# Patient Record
Sex: FEMALE | Race: BLACK OR AFRICAN AMERICAN | NOT HISPANIC OR LATINO | URBAN - METROPOLITAN AREA
[De-identification: names, ages, dates, MRNs, and addresses within clinical notes are randomized per-mention and may not be internally consistent; named-entity substitution may affect disease eponyms.]

---

## 2020-03-28 ENCOUNTER — HOSPITAL ENCOUNTER (EMERGENCY)
Facility: HOSPITAL | Age: 55
Discharge: HOME | End: 2020-03-28
Attending: EMERGENCY MEDICINE
Payer: MEDICAID

## 2020-03-28 VITALS
OXYGEN SATURATION: 97 % | SYSTOLIC BLOOD PRESSURE: 113 MMHG | DIASTOLIC BLOOD PRESSURE: 57 MMHG | WEIGHT: 145 LBS | BODY MASS INDEX: 26.68 KG/M2 | RESPIRATION RATE: 18 BRPM | HEIGHT: 62 IN | HEART RATE: 73 BPM | TEMPERATURE: 98 F

## 2020-03-28 DIAGNOSIS — Z20.822 SUSPECTED COVID-19 VIRUS INFECTION: Primary | ICD-10-CM

## 2020-03-28 DIAGNOSIS — B34.9 VIRAL SYNDROME: ICD-10-CM

## 2020-03-28 LAB
CTP QC/QA: YES
POC MOLECULAR INFLUENZA A AGN: NEGATIVE
POC MOLECULAR INFLUENZA B AGN: NEGATIVE

## 2020-03-28 PROCEDURE — 99282 EMERGENCY DEPT VISIT SF MDM: CPT

## 2020-03-28 PROCEDURE — 99282 EMERGENCY DEPT VISIT SF MDM: CPT | Mod: ,,, | Performed by: EMERGENCY MEDICINE

## 2020-03-28 PROCEDURE — 87502 INFLUENZA DNA AMP PROBE: CPT

## 2020-03-28 PROCEDURE — 99282 PR EMERGENCY DEPT VISIT,LEVEL II: ICD-10-PCS | Mod: ,,, | Performed by: EMERGENCY MEDICINE

## 2020-03-28 RX ORDER — AMLODIPINE BESYLATE 5 MG/1
5 TABLET ORAL DAILY
COMMUNITY

## 2020-03-28 NOTE — ED PROVIDER NOTES
Encounter Date: 3/28/2020       History     Chief Complaint   Patient presents with    Shortness of Breath     x 2 days, loss of smell x 2 wks,4 6no diarrhea, denies cough, no fever. Pt has been around ill family members exposed to Covid. [phone]     HPI   54-year-old female with past history of hypertension and anemia coming in with 2 weeks of loss smell as well as 2 weeks of shortness of breath.  No fevers, no cough.  Some mild nausea.  Intermittent headaches, however these have been present for several years.  No abdominal pain no diarrhea.   Patient has been taking over-the-counter cough remedies as needed.    Patient has several family members/friends who themselves have a contact that is known COVID positive and is in the hospital.    Review of patient's allergies indicates:   Allergen Reactions    Penicillins Rash     Past Medical History:   Diagnosis Date    Hypertension      History reviewed. No pertinent surgical history.  History reviewed. No pertinent family history.  Social History     Tobacco Use    Smoking status: Current Every Day Smoker    Smokeless tobacco: Never Used   Substance Use Topics    Alcohol use: Yes    Drug use: Not on file     Review of Systems   Constitutional:  No Fever, No Chills,   Eyes: No Vision Changes  ENT/Mouth:  Positive loss of smell  Cardiovascular:  No Chest Pain  Respiratory:  No Cough, positive SOB  Gastrointestinal:  Positive Nausea, No Vomiting, No Diarrhea, No abdo pain.  Genitourinary:  Infant No dysuria   Musculoskeletal:  No Arthralgias, No Back Pain, No Neck Pain,  Skin:  No skin Lesions  Neuro:  No Weakness, No Dizziness, No Headache        Physical Exam     Initial Vitals   BP Pulse Resp Temp SpO2   03/28/20 0950 03/28/20 0930 03/28/20 0950 03/28/20 0930 03/28/20 0930   (!) 113/57 66 18 99.6 °F (37.6 °C) 97 %      MAP       --                Physical Exam   Physical Exam:  CONSTITUTIONAL: Well developed, well nourished, in no acute distress, calm  HENT:  Normocephalic, atraumatic    EYES: Sclerae anicteric   NECK: Supple  RESPIRATORY: Breathing comfortably. Speaking in full sentences   NEUROLOGIC: Alert, interacting normally. No facial droop.   MSK: Moving all four extremities.  Skin: Warm and dry. No visible rash on exposed areas of skin.    Psych: Mood and affect normal.       ED Course   Procedures  Labs Reviewed   POCT INFLUENZA A/B MOLECULAR          Imaging Results    None          Medical Decision Making:   History:   Old Medical Records: I decided to obtain old medical records.    54-year-old female with past medical history as noted coming in with 2 weeks of loss of smell and shortness of breath.  On exam she is well-appearing, not dyspneic, speaking in full sentences.  Vitals are benign with a normal O2 sat.    Given her good respiratory status and vital signs at this time there is no indication for chest x-ray or labs.    Patient does not meet [facility] criteria for COVID testing at this time.  I explained the patient that given her symptoms, it very likely that she has corona virus, however at this time this seems to be a mild case.    Discharge with home isolation, over-the-counter supportive care such as Tylenol, good hydration, rest, and return precautions for worsening illness or shortness of breath.    Findings of ED work up explained to patient. Patient agrees with discharge plan and verbalizes understanding of return precautions.       MDM Complexity Points:   Problem Points:  1.New problem, with no additional ED work-up planned (maximum of 1) - viral syndrome    Data Points:  Decision to obtain old records (in the EHR)                                                          Ton Russo MD  03/28/20 1017

## 2020-03-28 NOTE — DISCHARGE INSTRUCTIONS
At this point, it is likely that you have COVID-19. Read the following and attached handout for more information.    We are recommending good hand hygiene, isolate yourself from other people, do not go to work, do not attend public events for 14 days or until your symptom free for 3 days. This means staying at home and significantly limiting outside contact. Wear a mask around others.     For fever, cough, shortness of breath, or other viral respiratory symptoms, we recommend supportive care measures.  This includes staying well hydrated, getting plenty of sleep, taking Tylenol for fevers or pain.    For any severe shortness of breath, or other symptoms that you believe constitute an emergency, are a reason to return to the hospital.    Our goal in the emergency department is to always give you outstanding care and exceptional service. You may receive a survey by mail or e-mail in the next week regarding your experience in our ED. We would greatly appreciate your completing and returning the survey. Your feedback provides us with a way to recognize our staff who give very good care and it helps us learn how to improve when your experience was below our aspiration of excellence.

## 2020-12-02 ENCOUNTER — CLINICAL SUPPORT (OUTPATIENT)
Dept: URGENT CARE | Facility: CLINIC | Age: 55
End: 2020-12-02
Payer: MEDICAID

## 2020-12-02 DIAGNOSIS — Z11.59 SCREENING FOR VIRAL DISEASE: Primary | ICD-10-CM

## 2020-12-02 LAB
CTP QC/QA: YES
SARS-COV-2 RDRP RESP QL NAA+PROBE: NEGATIVE

## 2020-12-02 PROCEDURE — 87635 SARS-COV-2 COVID-19 AMP PRB: CPT | Mod: QW,S$GLB,, | Performed by: PHYSICIAN ASSISTANT

## 2020-12-02 PROCEDURE — 87635: ICD-10-PCS | Mod: QW,S$GLB,, | Performed by: PHYSICIAN ASSISTANT

## 2021-03-23 ENCOUNTER — HOSPITAL ENCOUNTER (OUTPATIENT)
Dept: RADIOLOGY | Facility: OTHER | Age: 56
Discharge: HOME | End: 2021-03-23
Attending: FAMILY MEDICINE
Payer: MEDICAID

## 2021-03-23 DIAGNOSIS — Z12.31 ENCOUNTER FOR SCREENING MAMMOGRAM FOR MALIGNANT NEOPLASM OF BREAST: ICD-10-CM

## 2021-03-23 PROCEDURE — 77067 SCR MAMMO BI INCL CAD: CPT | Mod: 26,,, | Performed by: RADIOLOGY

## 2021-03-23 PROCEDURE — 77063 MAMMO DIGITAL SCREENING BILAT WITH TOMO: ICD-10-PCS | Mod: 26,,, | Performed by: RADIOLOGY

## 2021-03-23 PROCEDURE — 77063 BREAST TOMOSYNTHESIS BI: CPT | Mod: 26,,, | Performed by: RADIOLOGY

## 2021-03-23 PROCEDURE — 77067 SCR MAMMO BI INCL CAD: CPT | Mod: TC

## 2021-03-23 PROCEDURE — 77067 MAMMO DIGITAL SCREENING BILAT WITH TOMO: ICD-10-PCS | Mod: 26,,, | Performed by: RADIOLOGY

## 2022-03-03 ENCOUNTER — HOSPITAL ENCOUNTER (EMERGENCY)
Facility: OTHER | Age: 57
Discharge: HOME | End: 2022-03-03
Attending: EMERGENCY MEDICINE
Payer: MEDICAID

## 2022-03-03 VITALS
SYSTOLIC BLOOD PRESSURE: 162 MMHG | TEMPERATURE: 98 F | DIASTOLIC BLOOD PRESSURE: 77 MMHG | OXYGEN SATURATION: 99 % | RESPIRATION RATE: 18 BRPM | HEIGHT: 62 IN | HEART RATE: 65 BPM | WEIGHT: 142 LBS | BODY MASS INDEX: 26.13 KG/M2

## 2022-03-03 DIAGNOSIS — S39.012A BACK STRAIN, INITIAL ENCOUNTER: Primary | ICD-10-CM

## 2022-03-03 PROCEDURE — 63600175 PHARM REV CODE 636 W HCPCS: Performed by: NURSE PRACTITIONER

## 2022-03-03 PROCEDURE — 96372 THER/PROPH/DIAG INJ SC/IM: CPT

## 2022-03-03 PROCEDURE — 25000003 PHARM REV CODE 250: Performed by: NURSE PRACTITIONER

## 2022-03-03 PROCEDURE — 99284 EMERGENCY DEPT VISIT MOD MDM: CPT | Mod: 25

## 2022-03-03 RX ORDER — METHOCARBAMOL 500 MG/1
1000 TABLET, FILM COATED ORAL 3 TIMES DAILY
Qty: 30 TABLET | Refills: 0 | Status: SHIPPED | OUTPATIENT
Start: 2022-03-03 | End: 2022-03-08

## 2022-03-03 RX ORDER — KETOROLAC TROMETHAMINE 30 MG/ML
30 INJECTION, SOLUTION INTRAMUSCULAR; INTRAVENOUS
Status: COMPLETED | OUTPATIENT
Start: 2022-03-03 | End: 2022-03-03

## 2022-03-03 RX ORDER — NAPROXEN 375 MG/1
375 TABLET ORAL 2 TIMES DAILY WITH MEALS
Qty: 60 TABLET | Refills: 0 | Status: SHIPPED | OUTPATIENT
Start: 2022-03-03

## 2022-03-03 RX ORDER — ORPHENADRINE CITRATE 100 MG/1
100 TABLET, EXTENDED RELEASE ORAL
Status: COMPLETED | OUTPATIENT
Start: 2022-03-03 | End: 2022-03-03

## 2022-03-03 RX ORDER — LIDOCAINE 50 MG/G
1 PATCH TOPICAL DAILY
Qty: 15 PATCH | Refills: 0 | Status: SHIPPED | OUTPATIENT
Start: 2022-03-03

## 2022-03-03 RX ADMIN — ORPHENADRINE CITRATE 100 MG: 100 TABLET, EXTENDED RELEASE ORAL at 06:03

## 2022-03-03 RX ADMIN — KETOROLAC TROMETHAMINE 30 MG: 30 INJECTION, SOLUTION INTRAMUSCULAR at 06:03

## 2022-03-03 NOTE — FIRST PROVIDER EVALUATION
"Medical screening exam completed.  I have conducted a focused provider triage encounter, findings are as follows:    Brief history of present illness:  Lower back pain after picking up something heavy at work.      Vitals:    03/03/22 1618   BP: (!) 162/77   BP Location: Left arm   Patient Position: Sitting   Pulse: 65   Resp: 18   Temp: 97.7 °F (36.5 °C)   TempSrc: Oral   SpO2: 99%   Weight: 64.4 kg (142 lb)   Height: 5' 2" (1.575 m)       Pertinent physical exam:  Lower back tenderness    Brief workup plan:  Xray, nsaid    Preliminary workup initiated; this workup will be continued and followed by the physician or advanced practice provider that is assigned to the patient when roomed.  "

## 2022-03-04 NOTE — ED PROVIDER NOTES
Encounter Date: 3/3/2022       History     Chief Complaint   Patient presents with    Back Pain     Pt c.o mid back pain onset yesterday after picking up something yesterday at home. Pt denies urinary symptoms. Pt states no relief with tylenol.  Pt states pain worse on movement      This is the urgent evaluation 56-year-old  female with past medical history of hypertension presents emergency department complaining of mid lower back pain that began yesterday after she picked up something heavy while at work.  States pain began shortly after she picked up this heavy box.  She has tried over-the-counter medications with no significant improvement in symptoms.  Pain is worse with any movement.  She denies pain radiating down her legs.  Denies any numbness or tingling to lower extremities or any saddle paresthesia.  Patient is ambulatory with steady gait.        Review of patient's allergies indicates:   Allergen Reactions    Penicillins Rash     Past Medical History:   Diagnosis Date    Hypertension      No past surgical history on file.  No family history on file.  Social History     Tobacco Use    Smoking status: Current Every Day Smoker    Smokeless tobacco: Never Used   Substance Use Topics    Alcohol use: Yes     Review of Systems   Constitutional: Negative for chills and fever.   Respiratory: Negative for cough and shortness of breath.    Cardiovascular: Negative for chest pain.   Gastrointestinal: Negative for abdominal pain.   Musculoskeletal: Positive for back pain. Negative for arthralgias, gait problem and neck pain.   Neurological: Negative for headaches.   All other systems reviewed and are negative.      Physical Exam     Initial Vitals [03/03/22 1618]   BP Pulse Resp Temp SpO2   (!) 162/77 65 18 97.7 °F (36.5 °C) 99 %      MAP       --         Physical Exam    Nursing note and vitals reviewed.  Constitutional: Vital signs are normal. She appears well-developed and well-nourished. She  does not appear ill. No distress.   HENT:   Head: Normocephalic and atraumatic.   Neck: Neck supple.   Cardiovascular: Normal rate, regular rhythm, S1 normal, S2 normal and normal heart sounds. Exam reveals no gallop.    No murmur heard.  Pulmonary/Chest: Effort normal and breath sounds normal. No tachypnea. She has no decreased breath sounds. She has no wheezes.   Abdominal: Abdomen is soft and flat. There is no abdominal tenderness.   Musculoskeletal:      Cervical back: Neck supple.      Lumbar back: Spasms and tenderness present. No bony tenderness. Normal range of motion. Negative right straight leg raise test and negative left straight leg raise test.      Comments: Bilateral paraspinal musculature if tender to palpation.  No step-offs or midline tenderness appreciated.     Neurological: She is alert and oriented to person, place, and time. GCS eye subscore is 4. GCS verbal subscore is 5. GCS motor subscore is 6.   Negative bilateral straight leg test, no footdrop appreciated.  Patient is ambulatory   Skin: Skin is warm, dry and intact.   Psychiatric: She has a normal mood and affect. Her behavior is normal.         ED Course   Procedures  Labs Reviewed - No data to display       Imaging Results          X-Ray Lumbar Spine 2 Or 3 Views (Final result)  Result time 03/03/22 17:21:33    Final result by Renato Scherer MD (03/03/22 17:21:33)                 Impression:      1. No acute displaced fracture or dislocation of the lumbar spine.      Electronically signed by: Renato Scherer MD  Date:    03/03/2022  Time:    17:21             Narrative:    EXAMINATION:  XR LUMBAR SPINE 2 OR 3 VIEWS    COMPARISON:  None    FINDINGS:  Three views lumbar spine.    There is osteopenia.  Lateral imaging demonstrates adequate alignment of the lumbar spine without significant vertebral body height loss or disc space height loss.  The facet joints are aligned noting lower lumbar facet arthropathy.  AP spinal alignment is  remarkable for mild dextroscoliotic curvature.  The bilateral sacroiliac joints are intact.                                 Medications   ketorolac injection 30 mg (30 mg Intramuscular Given 3/3/22 1801)   orphenadrine 12 hr tablet 100 mg (100 mg Oral Given 3/3/22 1800)     Medical Decision Making:   Differential Diagnosis:   Differential Diagnosis includes, but is not limited to:  Cauda equina syndrome, diskitis/osteomyelitis, epidural/paraspinal abscess, vertebral fracture, spinal cord injury, disc herniation, spinal stenosis, sciatica, radiculopathy, lumbar muscle strain, muscle spasm, neuropathic pain, UTI/pyelonephritis, nephrolithiasis    Clinical Tests:   Radiological Study: Ordered and Reviewed  ED Management:  56-year-old female presenting for evaluation of lower back pain that began after she picked up a heavy box while at work.  No relief with over-the-counter medications.  Patient on exam well-appearing with no midline tenderness.  Pain was worse with any movement.  Treated with Toradol and Robaxin emergency department reported feeling improved her pain.  Will discharge patient home with Robaxin, NSAIDs and lidocaine patches.  Discussed using heat.  I did discuss she develops any worsening symptoms, day numbness to her lower extremities she return to emergency department for re-evaluation.  She stated understanding.                      Clinical Impression:   Final diagnoses:  [S39.012A] Back strain, initial encounter (Primary)          ED Disposition Condition    Discharge Stable        ED Prescriptions     Medication Sig Dispense Start Date End Date Auth. Provider    naproxen (NAPROSYN) 375 MG tablet Take 1 tablet (375 mg total) by mouth 2 (two) times daily with meals. 60 tablet 3/3/2022  BRIANA Rodriguez    methocarbamoL (ROBAXIN) 500 MG Tab Take 2 tablets (1,000 mg total) by mouth 3 (three) times daily. for 5 days 30 tablet 3/3/2022 3/8/2022 BRIANA Rodriguez    LIDOcaine (LIDODERM) 5 % Place 1  patch onto the skin once daily. Remove & Discard patch within 12 hours or as directed by MD 15 patch 3/3/2022  BRIANA Rodriguez        Follow-up Information     Follow up With Specialties Details Why Contact Info    [facility] - Emergency Dept Emergency Medicine Go to  If symptoms worsen [address]  [facility] [address]  [phone]           BRIANA Rodriguez  03/03/22 2010

## 2023-03-06 ENCOUNTER — TELEPHONE (OUTPATIENT)
Dept: SMOKING CESSATION | Facility: CLINIC | Age: 58
End: 2023-03-06
Payer: MEDICAID

## 2024-02-25 ENCOUNTER — OFFICE VISIT (OUTPATIENT)
Dept: URGENT CARE | Facility: CLINIC | Age: 59
End: 2024-02-25
Payer: COMMERCIAL

## 2024-02-25 VITALS
WEIGHT: 142 LBS | DIASTOLIC BLOOD PRESSURE: 79 MMHG | SYSTOLIC BLOOD PRESSURE: 135 MMHG | TEMPERATURE: 99 F | RESPIRATION RATE: 17 BRPM | BODY MASS INDEX: 26.13 KG/M2 | HEART RATE: 61 BPM | OXYGEN SATURATION: 100 % | HEIGHT: 62 IN

## 2024-02-25 DIAGNOSIS — F17.200 SMOKES: ICD-10-CM

## 2024-02-25 DIAGNOSIS — R07.89 OTHER CHEST PAIN: Primary | ICD-10-CM

## 2024-02-25 DIAGNOSIS — E78.49 OTHER HYPERLIPIDEMIA: ICD-10-CM

## 2024-02-25 DIAGNOSIS — I10 PRIMARY HYPERTENSION: ICD-10-CM

## 2024-02-25 LAB — GLUCOSE SERPL-MCNC: 73 MG/DL (ref 70–110)

## 2024-02-25 PROCEDURE — 82962 GLUCOSE BLOOD TEST: CPT | Mod: S$GLB,,, | Performed by: FAMILY MEDICINE

## 2024-02-25 PROCEDURE — 93005 ELECTROCARDIOGRAM TRACING: CPT | Mod: S$GLB,,, | Performed by: FAMILY MEDICINE

## 2024-02-25 PROCEDURE — 93010 ELECTROCARDIOGRAM REPORT: CPT | Mod: S$GLB,,, | Performed by: INTERNAL MEDICINE

## 2024-02-25 PROCEDURE — 99214 OFFICE O/P EST MOD 30 MIN: CPT | Mod: S$GLB,,, | Performed by: FAMILY MEDICINE

## 2024-02-25 RX ORDER — NAPROXEN SODIUM 220 MG/1
324 TABLET, FILM COATED ORAL
Status: COMPLETED | OUTPATIENT
Start: 2024-02-25 | End: 2024-02-25

## 2024-02-25 RX ADMIN — NAPROXEN SODIUM 324 MG: 220 TABLET, FILM COATED ORAL at 01:02

## 2024-02-25 NOTE — PATIENT INSTRUCTIONS
58 yr old female with PMH HTN, smoking hx, presents with hest pain and dizziness that began this morning. Pt also has felt tingling and numbness going down left arm. Pt hasn't taken amlodapine in some time due to pcp moving.     EKG sinus bradycardia 55 beats per minute with nonspecific T-wave abnormality including T-wave inversions in V1 V2 and V3.    Vital signs within normal limits  Advise ED for further evaluation and management of acute onset chest pain with dizziness and left arm symptoms.  Rule out ACS and other acute chest conditions.

## 2024-02-25 NOTE — PROGRESS NOTES
"Subjective:      Patient ID: Francesca Logan is a 58 y.o. female.    Vitals:  height is 5' 2" (1.575 m) and weight is 64.4 kg (141 lb 15.6 oz). Her oral temperature is 98.7 °F (37.1 °C). Her blood pressure is 135/79 and her pulse is 61. Her respiration is 17 and oxygen saturation is 100%.     Chief Complaint: No chief complaint on file.    Pt with history of hypertension and hyperlipidemia and smoking presents chest pain and dizziness that began this morning. Pt also has felt tingling and numbness going down left arm. Pt hasn't taken amlodapine in some time due to pcp moving. Pt has an appointment with an new pcp on 3/11.  Had a few episodes since wakening.  And has been taking amlodipine for few days but took a dose this morning.  She does not take has been therapy.  She has not been compliant with the statin therapy.    Chest Pain   This is a new problem. The current episode started today. The onset quality is sudden. The quality of the pain is described as sharp. Associated symptoms include dizziness and numbness. Pertinent negatives include no malaise/fatigue. Lower extremity edema: has had some feet swelling recently, not today.      Cardiovascular:  Positive for chest pain.   Neurological:  Positive for dizziness and numbness.      Objective:     Physical Exam  Constitutional: Pt oriented to person, place, and time.  Non-toxic appearance.   Patient does not appear ill. No distress. normal  HENT: No icterus or facial swelling appreciated  Head: Normocephalic and atraumatic.   Nose: No congestion.   Pulmonary/Chest: Effort normal. No stridor. No respiratory distress. Cta b/l  S1/s2 RRRDictation #1  MRN:[id number]  CSN:[id number]   Abdominal: Normal appearance. Abdomen exhibits no distension.   Musculoskeletal:         General: No swelling.   Neurological: no focal deficit. Patient is alert and oriented to person, place, and time.   Skin: Skin is not diaphoretic and not pale. no jaundice  Psychiatric: Patients " behavior is normal. Mood, judgment and thought content normal.     Assessment:     1. Other chest pain    2. Primary hypertension    3. Other hyperlipidemia    4. Smokes        Plan:       Other chest pain  -     IN OFFICE EKG 12-LEAD (to Muse)  -     POCT Glucose, Hand-Held Device  -     aspirin chewable tablet 324 mg      Patient Instructions   58 yr old female with PMH HTN, smoking hx, presents with hest pain and dizziness that began this morning. Pt also has felt tingling and numbness going down left arm. Pt hasn't taken amlodapine in some time due to pcp moving.     EKG sinus bradycardia 55 beats per minute with nonspecific T-wave abnormality including T-wave inversions in V1 V2 and V3.    Vital signs within normal limits  Advise ED for further evaluation and management of acute onset chest pain with dizziness and left arm symptoms.  Rule out ACS and other acute chest conditions.

## 2024-02-26 LAB
OHS QRS DURATION: 84 MS
OHS QTC CALCULATION: 417 MS